# Patient Record
(demographics unavailable — no encounter records)

---

## 2025-01-14 NOTE — ASSESSMENT
[FreeTextEntry1] : Mr. COOPER SANDOVAL is a 81 year old   male  with history of  HTN, bph, recent dx bladder CA s/p TURBT, w excision of area w tumor, BCG, surveillance w Dr. Obando of [facility] presented today for comprehensive evaluation.  1) HCM - flu vacc encouraged annually . TDAP utd . PNVXs utd . Shingrix x 2 done . COVID vacc x 3 done - need date of third, to receive bivalent vacc, explained, agrees.  SB, SD, sunblock, exercise/wt loss strategy discussed.  Colon screen 2015, one hyperplastic polyp . PSA SDM discussion in past - has continued, now checked recently by uro . Just had full labs at time of illness/icterus.  All utd. Proxy is son, radiologist in [place].    2) HTN - well controlled, leaving regimen.  Lower Na diet, walking encouraged.  3) bph - no UTIs, retentive episodes, managed by uro, on doxazosin 8 and finasteride  4) bladder CA - s/p fulguration, now to have surveillance after BCG administered.   5) episode of bili 3-4 range, inc LFTs, upper GI syx, anorexia.  Now s/p resolution, imaging which likely was c/w passed biliary stone, incidental finding of cystic lesion in pancreas visually c/w cystadenoma.   6) also h.pylori found, w metaplasia.  Needs rx - wants to visit family in [place] over next few weeks, then come home and will rx triple therapy.  Will oblige.

## 2025-01-14 NOTE — HEALTH RISK ASSESSMENT
[Very Good] : ~his/her~  mood as very good [Yes] : Yes [Monthly or less (1 pt)] : Monthly or less (1 point) [1 or 2 (0 pts)] : 1 or 2 (0 points) [No] : In the past 12 months have you used drugs other than those required for medical reasons? No [No falls in past year] : Patient reported no falls in the past year [0] : 2) Feeling down, depressed, or hopeless: Not at all (0) [PHQ-2 Negative - No further assessment needed] : PHQ-2 Negative - No further assessment needed [Audit-CScore] : 1 [PYM0Boymg] : 0 [Change in mental status noted] : No change in mental status noted [None] : None [Alone] : lives alone [Retired] : retired [College] : College [] :  [# Of Children ___] : has [unfilled] children [High Risk Behavior] : no high risk behavior [Feels Safe at Home] : Feels safe at home [Fully functional (bathing, dressing, toileting, transferring, walking, feeding)] : Fully functional (bathing, dressing, toileting, transferring, walking, feeding) [Fully functional (using the telephone, shopping, preparing meals, housekeeping, doing laundry, using] : Fully functional and needs no help or supervision to perform IADLs (using the telephone, shopping, preparing meals, housekeeping, doing laundry, using transportation, managing medications and managing finances) [Reports changes in hearing] : Reports no changes in hearing [Reports changes in vision] : Reports no changes in vision [Reports normal functional visual acuity (ie: able to read med bottle)] : Reports poor functional visual acuity.  [Reports changes in dental health] : Reports changes in dental health [Smoke Detector] : smoke detector [Carbon Monoxide Detector] : carbon monoxide detector [Safety elements used in home] : safety elements used in home [Seat Belt] :  uses seat belt [Sunscreen] : uses sunscreen [Travel to Developing Areas] : travel to developing areas [TB Exposure] : is not being exposed to tuberculosis [Caregiver Concerns] : does not have caregiver concerns [ColonoscopyDate] : 05/15 [ColonoscopyComments] : hyperplastic polyp [Designated Healthcare Proxy] : Designated healthcare proxy [Relationship: ___] : Relationship: [unfilled]

## 2025-01-14 NOTE — COUNSELING
[Fall prevention counseling provided] : Fall prevention counseling provided [Sleep ___ hours/day] : Sleep [unfilled] hours/day [Engage in a relaxing activity] : Engage in a relaxing activity [AUDIT-C Screening administered and reviewed] : AUDIT-C Screening administered and reviewed [Hazards of at-risk alcohol use discussed] : Hazards of at-risk alcohol use discussed [Healthy eating counseling provided] : healthy eating [Activity counseling provided] : activity [Needs reinforcement, provided] : Patient needs reinforcement on understanding of disease, goals and obesity follow-up plan; reinforcement was provided [None] : None [Good understanding] : Patient has a good understanding of lifestyle changes and the steps needed to achieve self management goals

## 2025-01-14 NOTE — HISTORY OF PRESENT ILLNESS
[FreeTextEntry1] : Here for annual exam [de-identified] : Mr. COOPER SANDOVAL is a 81 year old   male  with history of  HTN, bph, recent dx bladder CA s/p TURBT, w excision of area w tumor, BCG, surveillance w Dr. Obando of [facility] presented today for comprehensive evaluation.  --------- Here for annual exam and to f/u   See recent events - had episode of nondescript dyspepsia, bloating, postprandial discomfort - avoided eating w onset of syx, had mild jaundice w/o pain RUQ.  Work up ultimately showed sludge in GB, favored ?passage of stone, imaging with cystic region in pancreatic neck near liver, s/p visualization via EUS - benign findings, likely incidental.  Saw Dr. Kumar and colleagues at GI here, had MRCP, then EGD/EUS.  Now syx resolved, feels baseline, needs LFTs to document resolution of inc bili and transaminitis he had with event.  Also at EGD h.pylori w metaplasia found - explained today, will rx.    From card/pulm perspective, no new issues.  Has continued good activity tolerance, continues w his bp regimen.

## 2025-01-14 NOTE — PHYSICAL EXAM
[No Acute Distress] : no acute distress [Well Nourished] : well nourished [Well Developed] : well developed [Well-Appearing] : well-appearing [Normal Sclera/Conjunctiva] : normal sclera/conjunctiva [PERRL] : pupils equal round and reactive to light [EOMI] : extraocular movements intact [Normal Outer Ear/Nose] : the outer ears and nose were normal in appearance [Normal Oropharynx] : the oropharynx was normal [Normal TMs] : both tympanic membranes were normal [Normal Nasal Mucosa] : the nasal mucosa was normal [No JVD] : no jugular venous distention [Supple] : supple [No Lymphadenopathy] : no lymphadenopathy [Thyroid Normal, No Nodules] : the thyroid was normal and there were no nodules present [No Respiratory Distress] : no respiratory distress  [Clear to Auscultation] : lungs were clear to auscultation bilaterally [No Accessory Muscle Use] : no accessory muscle use [Normal Percussion] : the chest was normal to percussion [Normal Rate] : normal rate  [Regular Rhythm] : with a regular rhythm [Normal S1, S2] : normal S1 and S2 [No Murmur] : no murmur heard [No Carotid Bruits] : no carotid bruits [No Abdominal Bruit] : a ~M bruit was not heard ~T in the abdomen [No Varicosities] : no varicosities [Pedal Pulses Present] : the pedal pulses are present [No Edema] : there was no peripheral edema [No Extremity Clubbing/Cyanosis] : no extremity clubbing/cyanosis [No Palpable Aorta] : no palpable aorta [Soft] : abdomen soft [Non Tender] : non-tender [Non-distended] : non-distended [No Masses] : no abdominal mass palpated [No HSM] : no HSM [Normal Bowel Sounds] : normal bowel sounds [Normal Supraclavicular Nodes] : no supraclavicular lymphadenopathy [Normal Posterior Cervical Nodes] : no posterior cervical lymphadenopathy [Normal Anterior Cervical Nodes] : no anterior cervical lymphadenopathy [No CVA Tenderness] : no CVA  tenderness [No Spinal Tenderness] : no spinal tenderness [No Joint Swelling] : no joint swelling [Grossly Normal Strength/Tone] : grossly normal strength/tone [No Rash] : no rash [No Skin Lesions] : no skin lesions [Normal Gait] : normal gait [Coordination Grossly Intact] : coordination grossly intact [No Focal Deficits] : no focal deficits [Deep Tendon Reflexes (DTR)] : deep tendon reflexes were 2+ and symmetric [Speech Grossly Normal] : speech grossly normal [Memory Grossly Normal] : memory grossly normal [Normal Affect] : the affect was normal [Alert and Oriented x3] : oriented to person, place, and time [Normal Mood] : the mood was normal [Normal Insight/Judgement] : insight and judgment were intact [de-identified] : icterus resolved [de-identified] : no suspicious nevi

## 2025-04-18 NOTE — INTERPRETER SERVICES
[Patient Declined  Services] : - None: Patient declined  services [FreeTextEntry3] : Pt prefers use Vietnamese with the writer who speaks Vietnamese.  [TWNoteComboBox1] : Turkmen

## 2025-04-18 NOTE — REVIEW OF SYSTEMS
[FreeTextEntry2] : Constitutional:  no fever and no chills.  Eyes:  no discharge.  HEENT:  no earache.  Cardiovascular:  no chest pain, no palpitations and no lower extremity edema.  Respiratory:  no shortness of breath, no wheezing and no cough.  Gastrointestinal:  no abdominal pain, no nausea and no vomiting.  Genitourinary:  no dysuria.  Musculoskeletal:  no joint pain.  Integumentary:  no itching.  Neurological:  no headache.  Psychiatric:  not suicidal.  Hematologic/Lymphatic:  no easy bleeding. [FreeTextEntry9] : see hpi  Denied perineal paresthesia, bladder/bowel habit change. Denied LE weakness.

## 2025-04-18 NOTE — HISTORY OF PRESENT ILLNESS
[FreeTextEntry1] : f/u [de-identified] : Mr. COOPER SANDOVAL is a 81 year old  male with history of HTN, bph, bladder CA s/p TURBT in 2022, [facility], w excision of area w tumor, BCG, surveillance w Dr. Obando of [facility] presented today for f/u.  He complains of insomnia when he needs to wake up early for Christianity activities. He uses prn Melatonin 15mg  before sleep with some help, but falling into sleep in early time was difficult. He asks for prn use of sleep medications.  He did not get PT yet. Renewed Rx for PT for hip pain.  Has been watching diet and taking Atorvastatin 10mg po qd without A/E.  Reports good appetite and aware of his Stool H.pylori test in Jan 2025 was negative  Denied fever, chills,CP, SOB, abdominal pain, n/v/c/d.  Prior 1/17/25, He came alone. He lives alone as wife passed away. He had retired from running stores. -Proxy is son, radiologist in [place]. He asks for full lab and Rx for physical therapy. He has had chronic insomnia and Dr. Garrett did not give him Ambien in the past. He is engaged in Christianity activities and has friends to talk. Denies fall, injury past year but feeling left leg pain with stiffness and wishes physical therapy. Denied fever, chills,CP, SOB, abdominal pain, n/v/c/d.

## 2025-04-18 NOTE — ASSESSMENT
[FreeTextEntry1] : Mr. COOPER SANDOVAL is a 81 year old  male with history of HTN, bph, bladder CA s/p TURBT in 2022, [facility], w excision of area w tumor, BCG, surveillance w Dr. Obando of [facility] presented today for f/u.  # HCM -Tdap: he is amenable to get Tdap shot today.  # insomnia Explained on benefit/risk of Trazodone. Sent rx for Trazodone 50mg po 1-2 hr before sleep once a day.   # Bladder cancer Made referral to oJse [facility], Uro who he lost follow up for over 1 year.   # HTN BP is acceptable 120/88 Continue current management including low salt diet and regular exercise. Continue take Losartan 100mg po qd.  # HLD Tolerating statin well. Continue take Atorvastatin 10mg po qd and low fat diet. Check Fasting lipid panel and hepatic function test today.   RTO in 6months.

## 2025-04-18 NOTE — PHYSICAL EXAM
[de-identified] : WDWN in NAD HEENT:  unremarkable Neck:  supple, no JVD, no LN Lungs:  CTA B/L, no W/R/R Heart:  Reg rate, +S1S2, no M/R/G Abdomen:  soft, NT, ND, +BS, no masses, no HS-megaly Genital: No pubic or genital lesions noted. Ext:  no C/C/E Neuro:  no focal deficits [de-identified] : chronic LBP, hip pain,